# Patient Record
Sex: FEMALE | Race: WHITE | NOT HISPANIC OR LATINO | URBAN - METROPOLITAN AREA
[De-identification: names, ages, dates, MRNs, and addresses within clinical notes are randomized per-mention and may not be internally consistent; named-entity substitution may affect disease eponyms.]

---

## 2023-10-08 ENCOUNTER — HOSPITAL ENCOUNTER (EMERGENCY)
Facility: HOSPITAL | Age: 53
Discharge: HOME | End: 2023-10-08
Attending: EMERGENCY MEDICINE
Payer: COMMERCIAL

## 2023-10-08 VITALS
WEIGHT: 257.28 LBS | RESPIRATION RATE: 16 BRPM | OXYGEN SATURATION: 98 % | SYSTOLIC BLOOD PRESSURE: 148 MMHG | HEART RATE: 80 BPM | BODY MASS INDEX: 42.87 KG/M2 | DIASTOLIC BLOOD PRESSURE: 80 MMHG | HEIGHT: 65 IN

## 2023-10-08 DIAGNOSIS — S01.511A LIP LACERATION, INITIAL ENCOUNTER: Primary | ICD-10-CM

## 2023-10-08 PROCEDURE — 90471 IMMUNIZATION ADMIN: CPT | Performed by: EMERGENCY MEDICINE

## 2023-10-08 PROCEDURE — 99283 EMERGENCY DEPT VISIT LOW MDM: CPT | Mod: 25

## 2023-10-08 PROCEDURE — 2500000004 HC RX 250 GENERAL PHARMACY W/ HCPCS (ALT 636 FOR OP/ED): Performed by: EMERGENCY MEDICINE

## 2023-10-08 PROCEDURE — 90715 TDAP VACCINE 7 YRS/> IM: CPT | Performed by: EMERGENCY MEDICINE

## 2023-10-08 PROCEDURE — 12011 RPR F/E/E/N/L/M 2.5 CM/<: CPT

## 2023-10-08 PROCEDURE — 2500000005 HC RX 250 GENERAL PHARMACY W/O HCPCS: Performed by: EMERGENCY MEDICINE

## 2023-10-08 PROCEDURE — 99284 EMERGENCY DEPT VISIT MOD MDM: CPT | Performed by: EMERGENCY MEDICINE

## 2023-10-08 RX ORDER — LIDOCAINE HYDROCHLORIDE 10 MG/ML
10 INJECTION INFILTRATION; PERINEURAL ONCE
Status: COMPLETED | OUTPATIENT
Start: 2023-10-08 | End: 2023-10-08

## 2023-10-08 RX ORDER — AMOXICILLIN AND CLAVULANATE POTASSIUM 875; 125 MG/1; MG/1
1 TABLET, FILM COATED ORAL EVERY 12 HOURS
Qty: 14 TABLET | Refills: 0 | Status: SHIPPED | OUTPATIENT
Start: 2023-10-08 | End: 2023-10-15

## 2023-10-08 RX ORDER — CHLORHEXIDINE GLUCONATE ORAL RINSE 1.2 MG/ML
15 SOLUTION DENTAL AS NEEDED
Qty: 120 ML | Refills: 0 | Status: SHIPPED | OUTPATIENT
Start: 2023-10-08 | End: 2023-10-22

## 2023-10-08 RX ADMIN — TETANUS TOXOID, REDUCED DIPHTHERIA TOXOID AND ACELLULAR PERTUSSIS VACCINE, ADSORBED 0.5 ML: 5; 2.5; 8; 8; 2.5 SUSPENSION INTRAMUSCULAR at 08:50

## 2023-10-08 RX ADMIN — LIDOCAINE HYDROCHLORIDE 100 MG: 10 INJECTION, SOLUTION INFILTRATION; PERINEURAL at 09:00

## 2023-10-08 ASSESSMENT — COLUMBIA-SUICIDE SEVERITY RATING SCALE - C-SSRS
6. HAVE YOU EVER DONE ANYTHING, STARTED TO DO ANYTHING, OR PREPARED TO DO ANYTHING TO END YOUR LIFE?: NO
2. HAVE YOU ACTUALLY HAD ANY THOUGHTS OF KILLING YOURSELF?: NO
1. IN THE PAST MONTH, HAVE YOU WISHED YOU WERE DEAD OR WISHED YOU COULD GO TO SLEEP AND NOT WAKE UP?: NO

## 2023-10-08 ASSESSMENT — PAIN - FUNCTIONAL ASSESSMENT: PAIN_FUNCTIONAL_ASSESSMENT: 0-10

## 2023-10-08 ASSESSMENT — PAIN SCALES - GENERAL: PAINLEVEL_OUTOF10: 0 - NO PAIN

## 2023-10-08 NOTE — ED PROVIDER NOTES
"ED Note    53-year-old female presenting to the emergency department with lip laceration following a fall out of bed this morning.  Patient notes that she was asleep, must of rolled out of bed, and struck her face on an end table next to her bed.  Denies loss of consciousness.  KNew immediately that her lip/face was injured.  Denies any loose teeth, denies any neck pain, denies any extremity injury.  Was able to ambulate since the incident.  Admits to pain at the site of the laceration.  Tells me that last tetanus injection was greater than 5 years ago.    PMH: None    A complete review of systems was performed and is negative unless otherwise noted.    PE:  BP (!) 149/92 (BP Location: Right arm, Patient Position: Sitting)   Pulse 82   Resp 18   Ht 1.651 m (5' 5\")   Wt 117 kg (257 lb 4.4 oz)   SpO2 96%   BMI 42.81 kg/m²    GEN: Alert, oriented, no apparent distress  HEENT:  RENEE, EOMI, OP clear, post OP symmetric, right lateral aspect of tooth 8 with Miller 1 fracture  Through and through laceration 2.5 cm on the cutaneous surface, 1.5 cm on the mucosal surface, to the lower lip.  No active bleeding  NECK: supple, no midline tenderness to palpation  CV: RRR, no m/r/g, distal pulses equal bilaterally  PULM: CTA bilaterally, no w/r/r  SKIN: See above  MS: no joint pain or swelling, FRANCO, no c/c/e  NEURO: alert, oriented, strength and sensation intact to all extremities, no focal deficits  PSYCH: normal affect      ED Course & MDM:        Nursing noted reviewed  Vital signs reviewed     IV/IM meds given - tetanus updated    See procedure note.    53-year-old female status post fall from bed presenting with lower lip laceration, through and through.  Small Miller 1 dental fracture to tooth 8 noted as well.  Laceration repaired primarily with close approximation on the cutaneous surface and loose approximation on the mucosal surface.  No concern for serious head or spine injury at this point in time.  Spoke with " patient about plan for prophylactic antibiotics with Augmentin, chlorhexidine rinse, and follow-up for cutaneous suture removal.  Mucosal sutures are dissolvable.  All questions answered, stable for discharge.     Impression:  lip laceration  Dental fracture  Disposition:  home     MD Elias Conte MD  10/08/23 0989

## 2023-10-08 NOTE — ED PROCEDURE NOTE
Procedure  Laceration Repair    Performed by: Elias Lehman MD  Authorized by: Elias Lehman MD    Consent:     Consent obtained:  Verbal    Consent given by:  Patient    Risks, benefits, and alternatives were discussed: yes      Risks discussed:  Infection and poor cosmetic result    Alternatives discussed:  No treatment  Universal protocol:     Procedure explained and questions answered to patient or proxy's satisfaction: yes      Relevant documents present and verified: yes      Immediately prior to procedure, a time out was called: yes      Patient identity confirmed:  Verbally with patient  Anesthesia:     Anesthesia method:  Local infiltration    Local anesthetic:  Lidocaine 1% w/o epi  Laceration details:     Location:  Lip    Lip location:  Lower lip, full thickness    Vermilion border involved: no      Length (cm):  2.5  Pre-procedure details:     Preparation:  Patient was prepped and draped in usual sterile fashion  Treatment:     Area cleansed with:  Chlorhexidine    Amount of cleaning:  Standard    Irrigation solution:  Sterile saline    Irrigation method:  Pressure wash    Visualized foreign bodies/material removed: no      Debridement:  None  Skin repair:     Repair method:  Sutures    Suture size:  4-0    Suture material:  Nylon    Suture technique:  Simple interrupted    Number of sutures:  5  Approximation:     Approximation:  Close  Repair type:     Repair type:  Intermediate  Comments:      Mucosal surface also repaired loosely with 2 4-0 gut sutures.               Elias Lehman MD  10/08/23 0952

## 2025-02-08 ENCOUNTER — HOSPITAL ENCOUNTER (EMERGENCY)
Facility: HOSPITAL | Age: 55
Discharge: HOME | End: 2025-02-08
Attending: EMERGENCY MEDICINE
Payer: COMMERCIAL

## 2025-02-08 VITALS
TEMPERATURE: 97.2 F | OXYGEN SATURATION: 97 % | BODY MASS INDEX: 42.98 KG/M2 | SYSTOLIC BLOOD PRESSURE: 160 MMHG | HEIGHT: 65 IN | DIASTOLIC BLOOD PRESSURE: 95 MMHG | HEART RATE: 86 BPM | RESPIRATION RATE: 18 BRPM | WEIGHT: 257.94 LBS

## 2025-02-08 DIAGNOSIS — S01.81XA FACE LACERATIONS, INITIAL ENCOUNTER: Primary | ICD-10-CM

## 2025-02-08 DIAGNOSIS — S09.90XA HEAD INJURY, INITIAL ENCOUNTER: ICD-10-CM

## 2025-02-08 PROCEDURE — 2500000004 HC RX 250 GENERAL PHARMACY W/ HCPCS (ALT 636 FOR OP/ED): Performed by: EMERGENCY MEDICINE

## 2025-02-08 PROCEDURE — 12011 RPR F/E/E/N/L/M 2.5 CM/<: CPT | Performed by: EMERGENCY MEDICINE

## 2025-02-08 PROCEDURE — 99283 EMERGENCY DEPT VISIT LOW MDM: CPT | Performed by: EMERGENCY MEDICINE

## 2025-02-08 RX ORDER — LIDOCAINE HYDROCHLORIDE AND EPINEPHRINE 10; 10 UG/ML; MG/ML
5 INJECTION, SOLUTION INFILTRATION; PERINEURAL ONCE
Status: COMPLETED | OUTPATIENT
Start: 2025-02-08 | End: 2025-02-08

## 2025-02-08 RX ADMIN — LIDOCAINE HYDROCHLORIDE AND EPINEPHRINE 5 ML: 10; 10 INJECTION, SOLUTION INFILTRATION; PERINEURAL at 08:24

## 2025-02-08 ASSESSMENT — COLUMBIA-SUICIDE SEVERITY RATING SCALE - C-SSRS
1. IN THE PAST MONTH, HAVE YOU WISHED YOU WERE DEAD OR WISHED YOU COULD GO TO SLEEP AND NOT WAKE UP?: NO
6. HAVE YOU EVER DONE ANYTHING, STARTED TO DO ANYTHING, OR PREPARED TO DO ANYTHING TO END YOUR LIFE?: NO
1. IN THE PAST MONTH, HAVE YOU WISHED YOU WERE DEAD OR WISHED YOU COULD GO TO SLEEP AND NOT WAKE UP?: NO
2. HAVE YOU ACTUALLY HAD ANY THOUGHTS OF KILLING YOURSELF?: NO

## 2025-02-08 ASSESSMENT — PAIN - FUNCTIONAL ASSESSMENT: PAIN_FUNCTIONAL_ASSESSMENT: 0-10

## 2025-02-08 ASSESSMENT — PAIN SCALES - GENERAL: PAINLEVEL_OUTOF10: 0 - NO PAIN

## 2025-02-08 NOTE — Clinical Note
Sarah Johnson was seen and treated in our emergency department on 2/8/2025.  She may return to work on 02/09/2025.       If you have any questions or concerns, please don't hesitate to call.      Kari Membreno MD

## 2025-02-08 NOTE — ED PROVIDER NOTES
HPI   Chief Complaint   Patient presents with    Fall     Comes in after falling out of bed and hitting her head on the dresser 30 min pta. Denies loc, does not take  blood thinners. Aox4 on arrival. Small laceration to right eye, no vision changes or issues, bleeding controlled        54-year-old female presents for right eyebrow laceration after falling out of bed approximately 30 minutes prior to arrival.  She did not lose consciousness hit her head on the dresser and then landed on the floor.  She does not take anticoagulants.  Denies other injuries.  No vomiting since the incident.      History provided by:  Patient and medical records          Patient History   No past medical history on file.  No past surgical history on file.  No family history on file.  Social History     Tobacco Use    Smoking status: Not on file    Smokeless tobacco: Not on file   Substance Use Topics    Alcohol use: Not on file    Drug use: Not on file       Physical Exam   ED Triage Vitals [02/08/25 0723]   Temperature Heart Rate Respirations BP   36.2 °C (97.2 °F) 86 18 (!) 160/95      Pulse Ox Temp Source Heart Rate Source Patient Position   97 % Temporal -- --      BP Location FiO2 (%)     -- --       Physical Exam  Vitals and nursing note reviewed.     General: Vitals reviewed. Awake, alert, well-developed, well-nourished, NAD  HEENT: NC, superficial, slightly gaping laceration over the right eyebrow, controlled, PERRL, extraocular movements intact, no evidence of open globe or ocular trauma visual acuity intact no hyphema, no bony step-offs or abnormalities on the face MMM  Neck: Supple, trachea midline no midline C-spine tenderness, step-offs, or deformities  Respiratory: No respiratory distress, lungs clear to auscultation bilaterally, no wheezes, rhonchi, or rales  CV: Regular rate and regular rhythm, no murmur/gallop/rubs  Extremities: Moving all extremities, no deformities  Neuro: A/Ox3, normal speech, strength 5/5 x 4,  sensation tact light touch in 4 GCS 15  Skin: Warm, dry. No rashes identified      ED Course & MDM   Diagnoses as of 02/08/25 1709   Face lacerations, initial encounter   Head injury, initial encounter                 No data recorded     Tim Coma Scale Score: 15 (02/08/25 0803 : Rosa Elena Lozano, HERMINIA)                           Medical Decision Making  54-year-old female presents for right eyelid laceration.  No evidence of ocular trauma.  No evidence of significant head injury and per [facility] CT rules no indication for CT brain which I did discuss with patient and she is agreeable although CT brain was considered to feel significant intracranial hemorrhage ruled out by history and physical exam.  As for laceration no evidence of deep structure involvement, secondary infection.  No visible foreign body.  Wound was irrigated, anesthetized, sutured with good approximation.  No obvious foreign body at this time, however patient was advised the possibility of retained foreign body.  Wound care, follow-up, and return instructions provided.  Head injury instructions provided.    Amount and/or Complexity of Data Reviewed  External Data Reviewed: notes.     Details: 11/12/2024 psychiatry outpatient note reviewed        Procedure  Procedures     Kari Membreno MD  02/08/25 8609

## 2025-02-08 NOTE — ED PROCEDURE NOTE
Procedure  Laceration Repair    Performed by: Kari Membreno MD  Authorized by: Kari Membreno MD    Consent:     Consent obtained:  Verbal    Consent given by:  Patient    Risks, benefits, and alternatives were discussed: yes      Risks discussed:  Infection, pain, poor cosmetic result, poor wound healing, nerve damage, need for additional repair, vascular damage and retained foreign body    Alternatives discussed:  Delayed treatment and no treatment  Universal protocol:     Procedure explained and questions answered to patient or proxy's satisfaction: yes      Relevant documents present and verified: yes      Immediately prior to procedure, a time out was called: yes      Patient identity confirmed:  Arm band  Anesthesia:     Anesthesia method:  Local infiltration    Local anesthetic:  Lidocaine 1% WITH epi  Laceration details:     Location:  Face    Face location:  R eyebrow    Length (cm):  1.6  Pre-procedure details:     Preparation:  Imaging obtained to evaluate for foreign bodies  Exploration:     Wound exploration: wound explored through full range of motion and entire depth of wound visualized      Wound extent: areolar tissue violated    Treatment:     Area cleansed with:  Saline    Amount of cleaning:  Standard    Irrigation solution:  Sterile saline    Irrigation method:  Syringe    Visualized foreign bodies/material removed: no      Debridement:  None    Undermining:  None  Skin repair:     Repair method:  Sutures    Suture size:  4-0    Suture material:  Nylon    Suture technique:  Simple interrupted    Number of sutures:  3  Approximation:     Approximation:  Close  Repair type:     Repair type:  Simple  Post-procedure details:     Procedure completion:  Tolerated well, no immediate complications               Kari Membreno MD  02/08/25 9154

## 2025-02-08 NOTE — DISCHARGE INSTRUCTIONS
"Keep your wound clean and dry.  Do not submerge in water such as a bathtub or swimming pool until completely healed and sutures/staples have been removed in 7-10 days.  You may wash gently with soap and water and pat dry.  You may change or bandage daily and apply a small amount of bacitracin or Neosporin antibiotic ointment 1-2 times daily.  Tylenol or ibuprofen as needed for pain.  Return immediately to the emergency department if you develop a fever, increasing redness, increased pain or swelling, or drainage from the wound site as this may indicate an infection.    After a head injury it is recommended that you rest your brain for 24 hours which means avoid \"screen time\" such as tv, computer, or phone and rest in a quiet space as much as possible. You should avoid any contact sports or other activities that put you at risk for a second head injury for at least one week or until all of your symptoms have resolved.  Over-the-counter medications as needed for pain.    Post-concussive syndrome can occur after head injury and includes headache, nausea, lightheadedness, difficulty concentrating, and many other symptoms that may last days to weeks, but most often resolve on their own.     There is a small percentage of patients who will have delayed bleeding in the brain after a head injury, especially if they are on blood thinner medications. This can develop in the first 12-24 hours after the injury.  If you develop any significant headaches, persistent vomiting, change in mental status, weakness in an arm or leg, confusion, or other neurologic symptoms, please return to the emergency department immediately for reevaluation.    "

## 2025-05-12 ENCOUNTER — PHARMACY VISIT (OUTPATIENT)
Dept: PHARMACY | Facility: CLINIC | Age: 55
End: 2025-05-12
Payer: COMMERCIAL

## 2025-05-12 PROCEDURE — RXOTC WILLOW AMBULATORY OTC CHARGE

## 2025-05-12 PROCEDURE — RXMED WILLOW AMBULATORY MEDICATION CHARGE

## 2025-05-12 RX ORDER — MELOXICAM 15 MG/1
15 TABLET ORAL DAILY
Qty: 30 TABLET | Refills: 0 | OUTPATIENT
Start: 2025-05-12